# Patient Record
(demographics unavailable — no encounter records)

---

## 2024-10-31 NOTE — DISCUSSION/SUMMARY
[de-identified] : We had a thorough discussion regarding the nature of his pain, the pathophysiology, as well as all treatment options. I discussed operative and non-operative treatment modalities. Advise patient to follow up with his general surgeon. All questions were answered and the patient verbalized understanding. The patient is in agreement with this treatment plan. Patient will follow up for repeat clinical assessment.

## 2024-10-31 NOTE — PHYSICAL EXAM
[de-identified] : General: Well appearing, no acute distress Neurologic: A&Ox3, No focal deficits Head: NCAT without abrasions, lacerations, or ecchymosis to head, face, or scalp Eyes: No scleral icterus, no gross abnormalities Respiratory: Equal chest wall expansion bilaterally, no accessory muscle use Lymphatic: No lymphadenopathy palpated Skin: Warm and dry Psychiatric: Normal mood and affect  Examination of the Left hip reveals no obvious deformity or leg length discrepancy. There is no swelling noted. The patient is nontender to palpation over the greater trochanter, groin, and IT band. The patients range of motion is to 110 degrees of hip flexion, 40 degrees of abduction, 20 degrees of adduction, 40 degrees of internal rotation and 45 degrees of external rotation. The patient has 5/5 strength to resisted hip flexion, abduction and adduction. The patient has a negative DAMIEN and FADIR Test. Negative Mckenzie Test. Negative resisted SLR test at 30 degrees of hip flexion (Stinchfield). Negative Piriformis Test. No SI joint instability. There is no pain with logrolling. The calf and thigh are soft and nontender bilaterally. The patient is grossly neurovascularly intact distally.  Examination of the Right hip reveals no obvious deformity or leg length discrepancy. There is no swelling noted. The patient is nontender to palpation over the greater trochanter, groin, and IT band. The patients range of motion is to 110 degrees of hip flexion, 40 degrees of abduction, 20 degrees of adduction, 40 degrees of internal rotation and 45 degrees of external rotation. The patient has 5/5 strength to resisted hip flexion, abduction and adduction. The patient has a negative DAMIEN and FADIR Test. Negative Mckenzie Test. Negative resisted SLR test at 30 degrees of hip flexion (Stinchfield). Negative Piriformis Test. No SI joint instability. There is no pain with logrolling. The calf and thigh are soft and nontender bilaterally. The patient is grossly neurovascularly intact distally.    [de-identified] : The following radiographs were ordered and read by me during this patient's visit. I reviewed each radiograph in detail with the patient and discussed the findings as highlighted below. 2 views of the bilateral hips and 3 views of the pelvis were obtained today that show no fracture, dislocation. There is no degenerative change seen. There is no malalignment. No obvious osseous abnormality. Otherwise unremarkable.

## 2024-10-31 NOTE — PHYSICAL EXAM
[de-identified] : General: Well appearing, no acute distress Neurologic: A&Ox3, No focal deficits Head: NCAT without abrasions, lacerations, or ecchymosis to head, face, or scalp Eyes: No scleral icterus, no gross abnormalities Respiratory: Equal chest wall expansion bilaterally, no accessory muscle use Lymphatic: No lymphadenopathy palpated Skin: Warm and dry Psychiatric: Normal mood and affect  Examination of the Left hip reveals no obvious deformity or leg length discrepancy. There is no swelling noted. The patient is nontender to palpation over the greater trochanter, groin, and IT band. The patients range of motion is to 110 degrees of hip flexion, 40 degrees of abduction, 20 degrees of adduction, 40 degrees of internal rotation and 45 degrees of external rotation. The patient has 5/5 strength to resisted hip flexion, abduction and adduction. The patient has a negative DAMIEN and FADIR Test. Negative Mckenzie Test. Negative resisted SLR test at 30 degrees of hip flexion (Stinchfield). Negative Piriformis Test. No SI joint instability. There is no pain with logrolling. The calf and thigh are soft and nontender bilaterally. The patient is grossly neurovascularly intact distally.  Examination of the Right hip reveals no obvious deformity or leg length discrepancy. There is no swelling noted. The patient is nontender to palpation over the greater trochanter, groin, and IT band. The patients range of motion is to 110 degrees of hip flexion, 40 degrees of abduction, 20 degrees of adduction, 40 degrees of internal rotation and 45 degrees of external rotation. The patient has 5/5 strength to resisted hip flexion, abduction and adduction. The patient has a negative DAMIEN and FADIR Test. Negative Mckenzie Test. Negative resisted SLR test at 30 degrees of hip flexion (Stinchfield). Negative Piriformis Test. No SI joint instability. There is no pain with logrolling. The calf and thigh are soft and nontender bilaterally. The patient is grossly neurovascularly intact distally.    [de-identified] : The following radiographs were ordered and read by me during this patient's visit. I reviewed each radiograph in detail with the patient and discussed the findings as highlighted below. 2 views of the bilateral hips and 3 views of the pelvis were obtained today that show no fracture, dislocation. There is no degenerative change seen. There is no malalignment. No obvious osseous abnormality. Otherwise unremarkable.

## 2024-10-31 NOTE — CONSULT LETTER
[Dear  ___] : Dear  [unfilled], [Consult Letter:] : I had the pleasure of evaluating your patient, [unfilled]. [Please see my note below.] : Please see my note below. [Sincerely,] : Sincerely, [FreeTextEntry3] : Dr. Carlos Hudson

## 2024-10-31 NOTE — HISTORY OF PRESENT ILLNESS
[de-identified] : ELLY is a 54 year male here today for bilateral hip pain. 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 He states that as of 09/01/2024 his hip pain has progressively worsened. Of note, he enjoys playing disc golf and reports that he has probably played about 50 times over the Summer of 2024.

## 2024-10-31 NOTE — ADDENDUM
[FreeTextEntry1] : Documented by Lola Hernandez acting as a scribe for Dr. Hudson and Agus Castillo PA-C on 10/31/2024. All medical record entries made by the Scribe were at my, Dr. Hudson, and Agus Castillo's, direction and personally dictated by me on 10/31/2024. I have reviewed the chart and agree that the record accurately reflects my personal performance of the history, physical exam, procedure and imaging.

## 2024-10-31 NOTE — HISTORY OF PRESENT ILLNESS
[de-identified] : ELLY is a 54 year male here today for bilateral hip pain. 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 He states that as of 09/01/2024 his hip pain has progressively worsened. Of note, he enjoys playing disc golf and reports that he has probably played about 50 times over the Summer of 2024.

## 2024-10-31 NOTE — DISCUSSION/SUMMARY
[de-identified] : We had a thorough discussion regarding the nature of his pain, the pathophysiology, as well as all treatment options. I discussed operative and non-operative treatment modalities. Advise patient to follow up with his general surgeon. All questions were answered and the patient verbalized understanding. The patient is in agreement with this treatment plan. Patient will follow up for repeat clinical assessment.

## 2025-02-21 NOTE — ASSESSMENT
[FreeTextEntry1] : #SK #Multiple benign nevi #Solar lentigo #Screening exam for skin cancer - no suspicious lesions on exam today - TBSE performed today - Advised sun protection. Recommended OTC sunscreen products (EltaMD/Neutrogena/La Roche Posay), including SPF30+ with broadband UV protection as well as proper use. Discussed OTC sun protective clothing - Counseled patient to monitor for changes, including mole monitoring and self-skin exams  #Inflamed skin tag x1, R scrotum - Skin tag removal performed today. Wound care discussed  #Condyloma acuminata x10 (penis >> mons) - Diagnosis, nature, disease course, treatment options and goals of therapy discussed - The patient was informed of the pathophysiology of their lesions and their treatment course with liquid nitrogen (cryosurgery). Side effects include blister formation, hypopigmentation, and scarring, as well as permanent nail dystrophy if applicable. Patient was verbally consented and the lesions identified above were treated with liquid nitrogen freeze, thaw, freeze each cycle x 2. The patient tolerated the procedure well.  Wound care instructions, care of a blister with vaseline, signs and symptoms of infection were discussed in full.  The patient denies any questions at this time.

## 2025-02-21 NOTE — PHYSICAL EXAM
[Alert] : alert [Oriented x 3] : ~L oriented x 3 [Well Nourished] : well nourished [Conjunctiva Non-injected] : conjunctiva non-injected [No Visual Lymphadenopathy] : no visual  lymphadenopathy [No Clubbing] : no clubbing [No Edema] : no edema [No Bromhidrosis] : no bromhidrosis [No Chromhidrosis] : no chromhidrosis [Full Body Skin Exam Performed] : performed [FreeTextEntry3] : General: Alert and oriented, in NAD.  All of the following were examined and were within normal limits, except as noted:   Scalp: Face, including eyelids, nose, lips, ears, oropharynx: Neck: Chest/Back/Abdomen: Bilateral Arms/Hands: Bilateral Legs/Feet: Buttocks, Genitalia, Anus/perineum:  	 Hair, Nails, Oral Mucosa, Eyes:   flat brown verruca on penile shaft x6 and mons x4 pedunculated papule R scrotum stuckon waxy brown papule R penile shaft brown homogenous macules and few papules on body blue nevus R forehead WHSS on back

## 2025-02-21 NOTE — HISTORY OF PRESENT ILLNESS
[FreeTextEntry1] : spots [de-identified] : ELLY MILIAN is a 54 year old M who present for f/u as below.   # Growths on groin # FBSE.  Spots scattered on body x years. Asymptomatic and unchanged. No alleviating/aggravating factors. Never been treated.  Derm hx: something excised on back Personal hx of skin cancer: BCC on chest 20 years ago FHx of skin cancer: parents Social hx: [employer]